# Patient Record
Sex: MALE | Race: WHITE | NOT HISPANIC OR LATINO
[De-identification: names, ages, dates, MRNs, and addresses within clinical notes are randomized per-mention and may not be internally consistent; named-entity substitution may affect disease eponyms.]

---

## 2024-07-11 ENCOUNTER — DASHBOARD ENCOUNTERS (OUTPATIENT)
Age: 54
End: 2024-07-11

## 2024-07-11 PROBLEM — 162864005: Status: ACTIVE | Noted: 2024-07-11

## 2024-07-11 PROBLEM — 267434003: Status: ACTIVE | Noted: 2024-07-11

## 2024-07-11 PROBLEM — 48694002: Status: ACTIVE | Noted: 2024-07-11

## 2024-07-11 PROBLEM — 59621000: Status: ACTIVE | Noted: 2024-07-11

## 2024-07-22 ENCOUNTER — OFFICE VISIT (OUTPATIENT)
Dept: URBAN - METROPOLITAN AREA CLINIC 86 | Facility: CLINIC | Age: 54
End: 2024-07-22

## 2024-07-22 RX ORDER — ALPRAZOLAM 0.5 MG/1
1 TABLET TABLET ORAL ONCE A DAY
Status: ACTIVE | COMMUNITY

## 2024-07-22 RX ORDER — BIMATOPROST 0.1 MG/ML
SOLUTION/ DROPS OPHTHALMIC
Qty: 1 | Refills: 0 | Status: ACTIVE | COMMUNITY
Start: 1900-01-01

## 2024-07-22 RX ORDER — ATORVASTATIN CALCIUM 10 MG/1
TAKE 1 TABLET (10 MG) BY ORAL ROUTE ONCE DAILY AT BEDTIME TABLET, FILM COATED ORAL 1
Qty: 0 | Refills: 0 | Status: ACTIVE | COMMUNITY
Start: 1900-01-01

## 2024-07-22 RX ORDER — LOSARTAN POTASSIUM AND HYDROCHLOROTHIAZIDE 100; 25 MG/1; MG/1
TAKE 1 TABLET BY ORAL ROUTE ONCE DAILY TABLET, FILM COATED ORAL 1
Qty: 0 | Refills: 0 | Status: ACTIVE | COMMUNITY
Start: 1900-01-01

## 2024-07-22 RX ORDER — AMLODIPINE BESYLATE 5 MG/1
TAKE 1 TABLET (5 MG) BY ORAL ROUTE ONCE DAILY TABLET ORAL 1
Qty: 0 | Refills: 0 | Status: ACTIVE | COMMUNITY
Start: 1900-01-01

## 2024-07-22 NOTE — HPI-TODAY'S VISIT:
Patient is a 53-year-old male to be seen for evaluation of abnormal liver labs being referred in by Dr. Gennaro Johnson for same. A copy of the note will be sent to referring provider. Allergies are listed as none. Medicines are listed as Xanax 0.5 mg one half a tablet once a day as needed.  Amlodipine 5 mg a day, atorvastatin 10 mg a day, indomethacin 25 mg 2 tabs 3 times a day x 3 days as needed for gout attack, losartan 100/hydrochlorothiazide 25 mg once a day, Lumigan 0.01% ophthalmic solution 1 drop to affected eyes at night. Past medical history of obesity, chest wall pain, hyperlipidemia, abnormal liver labs, history of rectal bleeding.  Also with history of anxiety, fatty liver, glaucoma, gout, prediabetes. Past surgical stomach surgery September 1969. Social history never smoker.  Alcohol use some days. CT scanning July 3 shows evidence of fatty liver. Patient referred to see us again for fatty liver that is by the recommended fibrosis.  CT also showed small kidney stones in both kidneys that were nonobstructive. Office visit June 21, 2024 mentions the patient was for multiple issues including hypertension and taking her medicines as well as taking her statin for hyperlipidemia liver labs unfortunately not better.  Labs are previously showing some prediabetes as well. Did Cologuard test 2022 that was negative. Some intermittent rib discomfort noted as well. Has rare gout attacks as well. Weight listed is 94.3 kg BMI of 30 History of intermittent rectal bleeding and was to see Dr. Ariza. Patient last seen here in 2019 and October 2019 WBC 7.3 hemoglobin 13.6 plate count 192 MCV 90 glucose 106 BUN of 19 creatinine 1.11 sodium 142 potassium 4.0 albumin 4.5 bilirubin 0.7 alk phos 63 AST 46 ALT 64 and ferritin slightly up at 501. Over 19,019 MRI at [facility] showed liver enlarged with right lobe measuring 22 cm with diffused signal dropout.  Geographic areas of signal intensity be an increase noted in the gallbladder fossa and august hepatis likely representing geographic fat.  Liver fat was 24% which at that timeframe was in the 6 to 24% mild steatosis range.  Punctate filling defect in the gallbladder that was felt to be tiny stone.  Spleen mildly lobulated.  No adrenal mass.  Pancreas unremarkable.  Kidneys enhance symmetrically.  No ascites.  No mechanical obstruction.  Hepatomegaly seen. Able to find last note from 4/28/2019 the mention he had been seen from about 2014 through that 2019 timeframe.  Lowest weight had been 180 highest weight 240 and average to 20.  BMI at that timeframe has been 32 patient had been working on his diet, exercise and weight loss.  Protein was elevated.  Patient also with history of mononucleosis in the past.  Patient immune to hep AMB.  On treatment for hyperlipidemia. Prior liver workup that included ceruloplasmin 18.7 alpha-1 129 and MM type.  Iron was negative.  Prior mononucleosis signs. Plan 1.  _update labs and check fib 4 index and reflex to elf. 2.  Chart ultrasound with elastography. 3.  If stage II-III consider new ROBLES drug. 4.  Needs continued effort on his fatty liver risk factors such as weight, diet and exercise, and controlling lipids. 5.  If prediabetic GLP-1's are new option for him as well to look at.  They help with both prediabetes and weight loss.. Duration of visit was  minutes with minutes spent prepping chart and loading info for the visit to [facility] records and then additional minutes spent for this face to face/telemed visit reviewing chart and events and plans with the pt.

## 2024-10-22 ENCOUNTER — OFFICE VISIT (OUTPATIENT)
Dept: URBAN - METROPOLITAN AREA CLINIC 86 | Facility: CLINIC | Age: 54
End: 2024-10-22

## 2024-10-22 RX ORDER — AMLODIPINE BESYLATE 5 MG/1
TAKE 1 TABLET (5 MG) BY ORAL ROUTE ONCE DAILY TABLET ORAL 1
Qty: 0 | Refills: 0 | Status: ACTIVE | COMMUNITY
Start: 1900-01-01

## 2024-10-22 RX ORDER — LOSARTAN POTASSIUM AND HYDROCHLOROTHIAZIDE 100; 25 MG/1; MG/1
TAKE 1 TABLET BY ORAL ROUTE ONCE DAILY TABLET, FILM COATED ORAL 1
Qty: 0 | Refills: 0 | Status: ACTIVE | COMMUNITY
Start: 1900-01-01

## 2024-10-22 RX ORDER — ATORVASTATIN CALCIUM 10 MG/1
TAKE 1 TABLET (10 MG) BY ORAL ROUTE ONCE DAILY AT BEDTIME TABLET, FILM COATED ORAL 1
Qty: 0 | Refills: 0 | Status: ACTIVE | COMMUNITY
Start: 1900-01-01

## 2024-10-22 RX ORDER — BIMATOPROST 0.1 MG/ML
SOLUTION/ DROPS OPHTHALMIC
Qty: 1 | Refills: 0 | Status: ACTIVE | COMMUNITY
Start: 1900-01-01

## 2024-10-22 RX ORDER — ALPRAZOLAM 0.5 MG/1
1 TABLET TABLET ORAL ONCE A DAY
Status: ACTIVE | COMMUNITY

## 2024-10-22 NOTE — HPI-TODAY'S VISIT:
Patient is a 54-year-old male to be seen for evaluation of abnormal liver labs being referred in by Dr. Gennaro Johnson for same.  A copy of the note will be sent to referring provider.  Allergies are listed as none.  Medicines are listed as Xanax 0.5 mg one half a tablet once a day as needed.  Amlodipine 5 mg a day, atorvastatin 10 mg a day, indomethacin 25 mg 2 tabs 3 times a day x 3 days as needed for gout attack, losartan 100/hydrochlorothiazide 25 mg once a day, Lumigan 0.01% ophthalmic solution 1 drop to affected eyes at night.  Past medical history of obesity, chest wall pain, hyperlipidemia, abnormal liver labs, history of rectal bleeding.  Also with history of anxiety, fatty liver, glaucoma, gout, prediabetes.  Past surgical stomach surgery September 1969.  Social history never smoker.  Alcohol use some days.  CT scanning July 3 shows evidence of fatty liver.  Patient referred to see us again for fatty liver that is by the recommended fibrosis.  CT also showed small kidney stones in both kidneys that were nonobstructive.  Office visit June 21, 2024 mentions the patient was for multiple issues including hypertension and taking her medicines as well as taking her statin for hyperlipidemia liver labs unfortunately not better.  Labs are previously showing some prediabetes as well.  Did Cologuard test 2022 that was negative.  Some intermittent rib discomfort noted as well.  Has rare gout attacks as well.  Weight listed is 94.3 kg BMI of 30  History of intermittent rectal bleeding and was to see Dr. Ariza.  Patient last seen here in 2019 and October 2019 WBC 7.3 hemoglobin 13.6 plate count 192 MCV 90 glucose 106 BUN of 19 creatinine 1.11 sodium 142 potassium 4.0 albumin 4.5 bilirubin 0.7 alk phos 63 AST 46 ALT 64 and ferritin slightly up at 501.  Over 19,019 MRI at [facility] showed liver enlarged with right lobe measuring 22 cm with diffused signal dropout.  Geographic areas of signal intensity be an increase noted in the gallbladder fossa and august hepatis likely representing geographic fat.  Liver fat was 24% which at that timeframe was in the 6 to 24% mild steatosis range.  Punctate filling defect in the gallbladder that was felt to be tiny stone.  Spleen mildly lobulated.  No adrenal mass.  Pancreas unremarkable.  Kidneys enhance symmetrically.  No ascites.  No mechanical obstruction.  Hepatomegaly seen.  Able to find last note from 4/28/2019 the mention he had been seen from about 2014 through that 2019 timeframe.  Lowest weight had been 180 highest weight 240 and average to 20.  BMI at that timeframe has been 32 patient had been working on his diet, exercise and weight loss.  Protein was elevated.  Patient also with history of mononucleosis in the past.  Patient immune to hep AMB.  On treatment for hyperlipidemia.  Prior liver workup that included ceruloplasmin 18.7 alpha-1 129 and MM type.  Iron was negative.  Prior mononucleosis signs.  Plan  1.  _update labs and check fib 4 index and reflex to elf.  2.  Chart ultrasound with elastography.  3.  If stage II-III consider new ROBLES drug.  4.  Needs continued effort on his fatty liver risk factors such as weight, diet and exercise, and controlling lipids.  5.  If prediabetic GLP-1's are new option for him as well to look at.  They help with both prediabetes and weight loss..  Duration of visit was  minutes with minutes spent prepping chart and loading info for the visit to [facility] records and then additional minutes spent for this face to face/telemed visit reviewing chart and events and plans with the pt.